# Patient Record
Sex: FEMALE | Race: BLACK OR AFRICAN AMERICAN | NOT HISPANIC OR LATINO | Employment: UNEMPLOYED | URBAN - METROPOLITAN AREA
[De-identification: names, ages, dates, MRNs, and addresses within clinical notes are randomized per-mention and may not be internally consistent; named-entity substitution may affect disease eponyms.]

---

## 2020-02-19 ENCOUNTER — HOSPITAL ENCOUNTER (OUTPATIENT)
Facility: HOSPITAL | Age: 80
Discharge: HOME | End: 2020-02-20
Attending: EMERGENCY MEDICINE | Admitting: HOSPITALIST
Payer: MEDICARE

## 2020-02-19 DIAGNOSIS — I49.3 VENTRICULAR ECTOPY: Primary | ICD-10-CM

## 2020-02-19 DIAGNOSIS — R55 SYNCOPE: ICD-10-CM

## 2020-02-19 DIAGNOSIS — R55 SYNCOPE AND COLLAPSE: ICD-10-CM

## 2020-02-19 PROBLEM — F03.90 DEMENTIA: Status: ACTIVE | Noted: 2020-02-19

## 2020-02-19 PROBLEM — E06.3 AUTOIMMUNE THYROIDITIS: Status: ACTIVE | Noted: 2020-02-19

## 2020-02-19 PROBLEM — R41.3 MEMORY LOSS: Status: ACTIVE | Noted: 2020-02-19

## 2020-02-19 LAB
ALBUMIN SERPL BCP-MCNC: 3.7 G/DL (ref 3.5–5.2)
ALP SERPL-CCNC: 130 U/L (ref 55–135)
ALT SERPL W/O P-5'-P-CCNC: 22 U/L (ref 10–44)
ANION GAP SERPL CALC-SCNC: 6 MMOL/L (ref 8–16)
AST SERPL-CCNC: 26 U/L (ref 10–40)
BASOPHILS # BLD AUTO: 0.01 K/UL (ref 0–0.2)
BASOPHILS NFR BLD: 0.2 % (ref 0–1.9)
BILIRUB SERPL-MCNC: 0.3 MG/DL (ref 0.1–1)
BILIRUB UR QL STRIP: NEGATIVE
BUN SERPL-MCNC: 18 MG/DL (ref 8–23)
CALCIUM SERPL-MCNC: 9.2 MG/DL (ref 8.7–10.5)
CHLORIDE SERPL-SCNC: 108 MMOL/L (ref 95–110)
CLARITY UR: CLEAR
CO2 SERPL-SCNC: 28 MMOL/L (ref 23–29)
COLOR UR: YELLOW
CREAT SERPL-MCNC: 1 MG/DL (ref 0.5–1.4)
DIFFERENTIAL METHOD: ABNORMAL
EOSINOPHIL # BLD AUTO: 0 K/UL (ref 0–0.5)
EOSINOPHIL NFR BLD: 1 % (ref 0–8)
ERYTHROCYTE [DISTWIDTH] IN BLOOD BY AUTOMATED COUNT: 13.5 % (ref 11.5–14.5)
EST. GFR  (AFRICAN AMERICAN): >60 ML/MIN/1.73 M^2
EST. GFR  (NON AFRICAN AMERICAN): 53 ML/MIN/1.73 M^2
GLUCOSE SERPL-MCNC: 100 MG/DL (ref 70–110)
GLUCOSE UR QL STRIP: ABNORMAL
HCT VFR BLD AUTO: 33.8 % (ref 37–48.5)
HGB BLD-MCNC: 11.2 G/DL (ref 12–16)
HGB UR QL STRIP: ABNORMAL
HYALINE CASTS #/AREA URNS LPF: 1 /LPF
IMM GRANULOCYTES # BLD AUTO: 0 K/UL (ref 0–0.04)
IMM GRANULOCYTES NFR BLD AUTO: 0 % (ref 0–0.5)
KETONES UR QL STRIP: ABNORMAL
LEUKOCYTE ESTERASE UR QL STRIP: NEGATIVE
LYMPHOCYTES # BLD AUTO: 1.5 K/UL (ref 1–4.8)
LYMPHOCYTES NFR BLD: 37.1 % (ref 18–48)
MCH RBC QN AUTO: 31.9 PG (ref 27–31)
MCHC RBC AUTO-ENTMCNC: 33.1 G/DL (ref 32–36)
MCV RBC AUTO: 96 FL (ref 82–98)
MICROSCOPIC COMMENT: NORMAL
MONOCYTES # BLD AUTO: 0.3 K/UL (ref 0.3–1)
MONOCYTES NFR BLD: 6.5 % (ref 4–15)
NEUTROPHILS # BLD AUTO: 2.3 K/UL (ref 1.8–7.7)
NEUTROPHILS NFR BLD: 55.2 % (ref 38–73)
NITRITE UR QL STRIP: NEGATIVE
NRBC BLD-RTO: 0 /100 WBC
PH UR STRIP: 7 [PH] (ref 5–8)
PLATELET # BLD AUTO: 195 K/UL (ref 150–350)
PMV BLD AUTO: 9.6 FL (ref 9.2–12.9)
POCT GLUCOSE: 98 MG/DL (ref 70–110)
POTASSIUM SERPL-SCNC: 3.7 MMOL/L (ref 3.5–5.1)
PROT SERPL-MCNC: 6.9 G/DL (ref 6–8.4)
PROT UR QL STRIP: NEGATIVE
RBC # BLD AUTO: 3.51 M/UL (ref 4–5.4)
RBC #/AREA URNS HPF: 1 /HPF (ref 0–4)
SODIUM SERPL-SCNC: 142 MMOL/L (ref 136–145)
SP GR UR STRIP: 1.01 (ref 1–1.03)
SQUAMOUS #/AREA URNS HPF: 1 /HPF
TROPONIN I SERPL DL<=0.01 NG/ML-MCNC: <0.006 NG/ML (ref 0–0.03)
TROPONIN I SERPL DL<=0.01 NG/ML-MCNC: <0.006 NG/ML (ref 0–0.03)
TSH SERPL DL<=0.005 MIU/L-ACNC: 1.72 UIU/ML (ref 0.4–4)
URN SPEC COLLECT METH UR: ABNORMAL
UROBILINOGEN UR STRIP-ACNC: NEGATIVE EU/DL
WBC # BLD AUTO: 4.15 K/UL (ref 3.9–12.7)

## 2020-02-19 PROCEDURE — 83036 HEMOGLOBIN GLYCOSYLATED A1C: CPT

## 2020-02-19 PROCEDURE — 93010 EKG 12-LEAD: ICD-10-PCS | Mod: ,,, | Performed by: INTERNAL MEDICINE

## 2020-02-19 PROCEDURE — 99285 EMERGENCY DEPT VISIT HI MDM: CPT | Mod: 25

## 2020-02-19 PROCEDURE — 84443 ASSAY THYROID STIM HORMONE: CPT

## 2020-02-19 PROCEDURE — 82962 GLUCOSE BLOOD TEST: CPT

## 2020-02-19 PROCEDURE — 36415 COLL VENOUS BLD VENIPUNCTURE: CPT

## 2020-02-19 PROCEDURE — 93010 ELECTROCARDIOGRAM REPORT: CPT | Mod: ,,, | Performed by: INTERNAL MEDICINE

## 2020-02-19 PROCEDURE — 80053 COMPREHEN METABOLIC PANEL: CPT

## 2020-02-19 PROCEDURE — 81000 URINALYSIS NONAUTO W/SCOPE: CPT

## 2020-02-19 PROCEDURE — 93005 ELECTROCARDIOGRAM TRACING: CPT

## 2020-02-19 PROCEDURE — G0378 HOSPITAL OBSERVATION PER HR: HCPCS

## 2020-02-19 PROCEDURE — 84484 ASSAY OF TROPONIN QUANT: CPT

## 2020-02-19 PROCEDURE — 85025 COMPLETE CBC W/AUTO DIFF WBC: CPT

## 2020-02-19 RX ORDER — LIOTHYRONINE SODIUM 5 UG/1
5 TABLET ORAL
Status: DISCONTINUED | OUTPATIENT
Start: 2020-02-20 | End: 2020-02-20 | Stop reason: HOSPADM

## 2020-02-19 RX ORDER — TALC
6 POWDER (GRAM) TOPICAL NIGHTLY PRN
Status: DISCONTINUED | OUTPATIENT
Start: 2020-02-19 | End: 2020-02-20 | Stop reason: HOSPADM

## 2020-02-19 RX ORDER — ACETAMINOPHEN 500 MG
500 TABLET ORAL EVERY 6 HOURS PRN
Status: DISCONTINUED | OUTPATIENT
Start: 2020-02-19 | End: 2020-02-20 | Stop reason: HOSPADM

## 2020-02-19 RX ORDER — AMLODIPINE BESYLATE 5 MG/1
10 TABLET ORAL DAILY
Status: DISCONTINUED | OUTPATIENT
Start: 2020-02-20 | End: 2020-02-20 | Stop reason: HOSPADM

## 2020-02-19 RX ORDER — DONEPEZIL HYDROCHLORIDE 10 MG/1
10 TABLET, FILM COATED ORAL NIGHTLY
Status: DISCONTINUED | OUTPATIENT
Start: 2020-02-19 | End: 2020-02-20 | Stop reason: HOSPADM

## 2020-02-19 RX ORDER — OLMESARTAN MEDOXOMIL 20 MG/1
40 TABLET ORAL DAILY
Status: DISCONTINUED | OUTPATIENT
Start: 2020-02-20 | End: 2020-02-19

## 2020-02-19 RX ORDER — SODIUM CHLORIDE 0.9 % (FLUSH) 0.9 %
10 SYRINGE (ML) INJECTION
Status: DISCONTINUED | OUTPATIENT
Start: 2020-02-19 | End: 2020-02-20 | Stop reason: HOSPADM

## 2020-02-19 RX ORDER — MONTELUKAST SODIUM 10 MG/1
10 TABLET ORAL NIGHTLY
Status: DISCONTINUED | OUTPATIENT
Start: 2020-02-19 | End: 2020-02-20 | Stop reason: HOSPADM

## 2020-02-19 RX ORDER — ATORVASTATIN CALCIUM 10 MG/1
10 TABLET, FILM COATED ORAL NIGHTLY
Status: DISCONTINUED | OUTPATIENT
Start: 2020-02-19 | End: 2020-02-20 | Stop reason: HOSPADM

## 2020-02-19 RX ORDER — HYDRALAZINE HYDROCHLORIDE 20 MG/ML
10 INJECTION INTRAMUSCULAR; INTRAVENOUS EVERY 8 HOURS PRN
Status: DISCONTINUED | OUTPATIENT
Start: 2020-02-19 | End: 2020-02-20 | Stop reason: HOSPADM

## 2020-02-19 RX ORDER — MEMANTINE HYDROCHLORIDE 5 MG/1
10 TABLET ORAL 2 TIMES DAILY
Status: DISCONTINUED | OUTPATIENT
Start: 2020-02-19 | End: 2020-02-20 | Stop reason: HOSPADM

## 2020-02-19 RX ORDER — AMOXICILLIN 250 MG
1 CAPSULE ORAL DAILY PRN
Status: DISCONTINUED | OUTPATIENT
Start: 2020-02-19 | End: 2020-02-20 | Stop reason: HOSPADM

## 2020-02-19 RX ORDER — ONDANSETRON 2 MG/ML
4 INJECTION INTRAMUSCULAR; INTRAVENOUS EVERY 6 HOURS PRN
Status: DISCONTINUED | OUTPATIENT
Start: 2020-02-19 | End: 2020-02-20 | Stop reason: HOSPADM

## 2020-02-19 RX ORDER — OLMESARTAN MEDOXOMIL 20 MG/1
40 TABLET ORAL NIGHTLY
Status: DISCONTINUED | OUTPATIENT
Start: 2020-02-19 | End: 2020-02-20 | Stop reason: HOSPADM

## 2020-02-19 NOTE — ED NOTES
Spoke with lab regarding delayed resulting for tests, approximately 15mins left for TSH testing. Will continue to monitor pt.

## 2020-02-19 NOTE — ED PROVIDER NOTES
"Encounter Date: 2/19/2020    SCRIBE #1 NOTE: I, Amita James, am scribing for, and in the presence of,  Orin Mcgregor MD. I have scribed the following portions of the note - Other sections scribed: HPI, ROS, PE, MDM.       History     Chief Complaint   Patient presents with    Loss of Consciousness     pt was leaning against a wall when family states she "went down".  they lowered her to ground.  pt denies pain or complaints     79 year old female patient presents to the ED complaining of an episode of syncope onset today. Her daughter reports that they were having a conversation when the patient leaned against a wall and began to collapse. She reports that her  saw that the patient was shaking before she fell. She reports that the patient lost consciousness for a few seconds. The patient denies any chest pain, leg swelling, or any other associated symptoms. She denies any prior history of these symptoms. She reports a past medical history of Hypertension, Hyperlipidemia, and autoimmune thyroiditis. She denies any tobacco use.    The history is provided by the patient and a relative.     Review of patient's allergies indicates:   Allergen Reactions    Chocolate flavor Hives    Pcn [penicillins] Rash     Past Medical History:   Diagnosis Date    GERD (gastroesophageal reflux disease)     Glaucoma (increased eye pressure)     Hyperlipidemia     Hypertension     Kidney stones     Osteoarthritis      Past Surgical History:   Procedure Laterality Date    CHOLECYSTECTOMY  2012    Laparoscopic    TUBAL LIGATION       Family History   Problem Relation Age of Onset    Diabetes Brother     Hypertension Mother     Stroke Neg Hx     Cancer Neg Hx     Breast cancer Neg Hx     Colon cancer Neg Hx     Ovarian cancer Neg Hx      Social History     Tobacco Use    Smoking status: Never Smoker    Smokeless tobacco: Never Used   Substance Use Topics    Alcohol use: No     Alcohol/week: 0.0 standard drinks "    Drug use: No     Review of Systems   Constitutional: Negative for fever.   HENT: Negative for sore throat.    Eyes: Negative for visual disturbance.   Respiratory: Negative for shortness of breath.    Cardiovascular: Negative for chest pain and leg swelling.   Gastrointestinal: Negative for nausea.   Genitourinary: Negative for dysuria.   Musculoskeletal: Negative for back pain.   Skin: Negative for rash.   Neurological: Positive for syncope.       Physical Exam     Initial Vitals [02/19/20 1209]   BP Pulse Resp Temp SpO2   129/68 64 16 97.8 °F (36.6 °C) 100 %      MAP       --         Physical Exam    Nursing note and vitals reviewed.  Constitutional: She appears well-developed and well-nourished. She is not diaphoretic. She appears distressed (Mild).   HENT:   Head: Normocephalic and atraumatic.   Eyes: Conjunctivae and EOM are normal. No scleral icterus. Right eye exhibits no nystagmus. Left eye exhibits no nystagmus.   No skew.   Neck: Normal range of motion. Neck supple.   Cardiovascular: Normal heart sounds. Exam reveals no gallop and no friction rub.    No murmur heard.  Occasional PVC's on monitor.   Pulmonary/Chest: Breath sounds normal. No respiratory distress. She has no wheezes. She has no rhonchi. She has no rales.   Abdominal: Soft. She exhibits no distension.   Musculoskeletal: Normal range of motion. She exhibits no edema or tenderness.   Neurological: She is alert and oriented to person, place, and time. GCS score is 15. GCS eye subscore is 4. GCS verbal subscore is 5. GCS motor subscore is 6.   No pronator drift.   Skin: Skin is warm and dry. Capillary refill takes less than 2 seconds. No rash noted.         ED Course   Procedures  Labs Reviewed   CBC W/ AUTO DIFFERENTIAL - Abnormal; Notable for the following components:       Result Value    RBC 3.51 (*)     Hemoglobin 11.2 (*)     Hematocrit 33.8 (*)     Mean Corpuscular Hemoglobin 31.9 (*)     All other components within normal limits    COMPREHENSIVE METABOLIC PANEL - Abnormal; Notable for the following components:    Anion Gap 6 (*)     eGFR if non  53 (*)     All other components within normal limits   TSH   TROPONIN I   POCT GLUCOSE MONITORING CONTINUOUS        ECG Results          EKG and show to ED MD (In process)  Result time 02/19/20 15:16:45    In process by Interface, Lab In [facility] (02/19/20 15:16:45)                 Narrative:    Test Reason : R55,    Vent. Rate : 065 BPM     Atrial Rate : 065 BPM     P-R Int : 160 ms          QRS Dur : 088 ms      QT Int : 420 ms       P-R-T Axes : 076 027 057 degrees     QTc Int : 436 ms    Normal sinus rhythm  Normal ECG  No previous ECGs available    Referred By: [email or web address]   SELF           Confirmed By:                   In process by Interface, Lab In [facility] (02/19/20 15:13:46)                 Narrative:    Test Reason : R55,    Vent. Rate : 065 BPM     Atrial Rate : 065 BPM     P-R Int : 160 ms          QRS Dur : 088 ms      QT Int : 420 ms       P-R-T Axes : 076 027 057 degrees     QTc Int : 436 ms    Normal sinus rhythm  Normal ECG  No previous ECGs available    Referred By: [email or web address]   SELF           Confirmed By:                             Imaging Results    None          Medical Decision Making:   History:   Old Medical Records: I decided to obtain old medical records.  Old Records Summarized: records from clinic visits.       <> Summary of Records: The patient has been seen frequently in the last few months for Alzheimer's disease and autoimmune thyroiditis.  Initial Assessment:   79 year old female patient presents to the ED complaining of an episode of syncope onset today. I will order lab work and reassess the patient once I have reviewed the results.  Clinical Tests:   Lab Tests: Ordered and Reviewed  Medical Tests: Ordered and Reviewed            Scribe Attestation:   Scribe #1: I performed the above scribed service and the documentation accurately describes the  services I performed. I attest to the accuracy of the note.            ED Course as of Feb 19 1623   Wed Feb 19, 2020   1523 CBC revealsA mild anemia of 11 and 33    [MH]   1523 Troponin is normal   Troponin I: <0.006 [MH]   1523 TSH is normal    [MH]   1523 CMP is normal    [MH]   1523 My independent interpretation of the EKG is normal sinus rhythm with no ST segment elevation or depression.  There is some left axis deviation.   EKG and show to ED MD [MH]      ED Course User Index  [MH] Orin Mcgregor MD                Clinical Impression:       ICD-10-CM ICD-9-CM   1. Syncope R55 780.2       MDM:  Patient does not meet low risk criteria for syncope due to her age and ectopy on the monitor.  I will place her in observation telemetry monitoring and trending of troponin.    3:55 PM  The medical management of Christa Caicedo has been transferred to the admitting team. Total Critical Care Time provided by me was: 45 minutes and included Coordination of care with other physicians  Review and interpretation of radiologic studies with re-assessment of plan of care  Review and interpretation of laboratory studies with re-assessment of plan of care  Review of diagnosis, treatment options and prognosis with patient  Review of diagnosis, treatment options and prognosis with family/caregiver.  At this time, the patient's condition is unchanged, and this was relayed to the accepting team.  Please see notes from the admitting team for continued care.  Orin Mcgregor 3:55 PM           I personally performed the services described in this documentation. All medical record entries made by the scribe were at my direction and in my presence. I have reviewed the chart and agree that the record reflects my personal performance and is accurate and complete.  Orin Mcgregor MD  02/19/20 1550       Orin Mcgregor MD  02/19/20 1555       Orin Mcgregor MD  02/19/20 1623

## 2020-02-20 VITALS
HEIGHT: 67 IN | TEMPERATURE: 98 F | OXYGEN SATURATION: 100 % | DIASTOLIC BLOOD PRESSURE: 67 MMHG | HEART RATE: 62 BPM | SYSTOLIC BLOOD PRESSURE: 143 MMHG | BODY MASS INDEX: 25.43 KG/M2 | WEIGHT: 162 LBS | RESPIRATION RATE: 18 BRPM

## 2020-02-20 LAB
ANION GAP SERPL CALC-SCNC: 7 MMOL/L (ref 8–16)
AORTIC ROOT ANNULUS: 3.25 CM
AORTIC VALVE CUSP SEPERATION: 2.23 CM
ASCENDING AORTA: 2.99 CM
AV INDEX (PROSTH): 0.8
AV MEAN GRADIENT: 2 MMHG
AV PEAK GRADIENT: 5 MMHG
AV VALVE AREA: 2.77 CM2
AV VELOCITY RATIO: 0.7
BSA FOR ECHO PROCEDURE: 1.86 M2
BUN SERPL-MCNC: 12 MG/DL (ref 8–23)
CALCIUM SERPL-MCNC: 9.3 MG/DL (ref 8.7–10.5)
CHLORIDE SERPL-SCNC: 108 MMOL/L (ref 95–110)
CHOLEST SERPL-MCNC: 184 MG/DL (ref 120–199)
CHOLEST/HDLC SERPL: 2 {RATIO} (ref 2–5)
CO2 SERPL-SCNC: 26 MMOL/L (ref 23–29)
CREAT SERPL-MCNC: 0.7 MG/DL (ref 0.5–1.4)
CV ECHO LV RWT: 0.44 CM
DOP CALC AO PEAK VEL: 1.14 M/S
DOP CALC AO VTI: 25.03 CM
DOP CALC LVOT AREA: 3.5 CM2
DOP CALC LVOT DIAMETER: 2.1 CM
DOP CALC LVOT PEAK VEL: 0.8 M/S
DOP CALC LVOT STROKE VOLUME: 69.41 CM3
DOP CALCLVOT PEAK VEL VTI: 20.05 CM
E WAVE DECELERATION TIME: 302.96 MSEC
E/A RATIO: 0.77
E/E' RATIO: 8.8 M/S
ECHO LV POSTERIOR WALL: 0.99 CM (ref 0.6–1.1)
EST. GFR  (AFRICAN AMERICAN): >60 ML/MIN/1.73 M^2
EST. GFR  (NON AFRICAN AMERICAN): >60 ML/MIN/1.73 M^2
ESTIMATED AVG GLUCOSE: 123 MG/DL (ref 68–131)
FRACTIONAL SHORTENING: 29 % (ref 28–44)
GLUCOSE SERPL-MCNC: 86 MG/DL (ref 70–110)
HBA1C MFR BLD HPLC: 5.9 % (ref 4–5.6)
HDLC SERPL-MCNC: 91 MG/DL (ref 40–75)
HDLC SERPL: 49.5 % (ref 20–50)
INTERVENTRICULAR SEPTUM: 1.08 CM (ref 0.6–1.1)
IVRT: 0.13 MSEC
LA MAJOR: 5.25 CM
LA MINOR: 4.98 CM
LA WIDTH: 4.23 CM
LDLC SERPL CALC-MCNC: 84.2 MG/DL (ref 63–159)
LEFT ATRIUM SIZE: 3.33 CM
LEFT ATRIUM VOLUME INDEX: 33.1 ML/M2
LEFT ATRIUM VOLUME: 61.2 CM3
LEFT INTERNAL DIMENSION IN SYSTOLE: 3.15 CM (ref 2.1–4)
LEFT VENTRICLE DIASTOLIC VOLUME INDEX: 49.06 ML/M2
LEFT VENTRICLE DIASTOLIC VOLUME: 90.71 ML
LEFT VENTRICLE MASS INDEX: 86 G/M2
LEFT VENTRICLE SYSTOLIC VOLUME INDEX: 21.4 ML/M2
LEFT VENTRICLE SYSTOLIC VOLUME: 39.54 ML
LEFT VENTRICULAR INTERNAL DIMENSION IN DIASTOLE: 4.46 CM (ref 3.5–6)
LEFT VENTRICULAR MASS: 158.45 G
LV LATERAL E/E' RATIO: 6.6 M/S
LV SEPTAL E/E' RATIO: 13.2 M/S
MV PEAK A VEL: 0.86 M/S
MV PEAK E VEL: 0.66 M/S
NONHDLC SERPL-MCNC: 93 MG/DL
PISA TR MAX VEL: 2.87 M/S
POTASSIUM SERPL-SCNC: 3.4 MMOL/L (ref 3.5–5.1)
PULM VEIN S/D RATIO: 1.69
PV PEAK D VEL: 0.39 M/S
PV PEAK S VEL: 0.66 M/S
PV PEAK VELOCITY: 0.69 CM/S
RA MAJOR: 5.25 CM
RA WIDTH: 4.53 CM
RIGHT VENTRICULAR END-DIASTOLIC DIMENSION: 3.11 CM
RV TISSUE DOPPLER FREE WALL SYSTOLIC VELOCITY 1 (APICAL 4 CHAMBER VIEW): 16.3 CM/S
SINUS: 2.92 CM
SODIUM SERPL-SCNC: 141 MMOL/L (ref 136–145)
STJ: 2.38 CM
TDI LATERAL: 0.1 M/S
TDI SEPTAL: 0.05 M/S
TDI: 0.08 M/S
TR MAX PG: 33 MMHG
TRICUSPID ANNULAR PLANE SYSTOLIC EXCURSION: 2.67 CM
TRIGL SERPL-MCNC: 44 MG/DL (ref 30–150)
TROPONIN I SERPL DL<=0.01 NG/ML-MCNC: <0.006 NG/ML (ref 0–0.03)

## 2020-02-20 PROCEDURE — 99219 PR INITIAL OBSERVATION CARE,LEVL II: ICD-10-PCS | Mod: 25,,, | Performed by: INTERNAL MEDICINE

## 2020-02-20 PROCEDURE — G0378 HOSPITAL OBSERVATION PER HR: HCPCS

## 2020-02-20 PROCEDURE — 84484 ASSAY OF TROPONIN QUANT: CPT

## 2020-02-20 PROCEDURE — 80048 BASIC METABOLIC PNL TOTAL CA: CPT

## 2020-02-20 PROCEDURE — 80061 LIPID PANEL: CPT

## 2020-02-20 PROCEDURE — 36415 COLL VENOUS BLD VENIPUNCTURE: CPT

## 2020-02-20 PROCEDURE — 99219 PR INITIAL OBSERVATION CARE,LEVL II: CPT | Mod: 25,,, | Performed by: INTERNAL MEDICINE

## 2020-02-20 PROCEDURE — 25000003 PHARM REV CODE 250: Performed by: NURSE PRACTITIONER

## 2020-02-20 RX ORDER — DONEPEZIL HYDROCHLORIDE 10 MG/1
10 TABLET, FILM COATED ORAL NIGHTLY
Qty: 30 TABLET | Refills: 11
Start: 2020-02-20 | End: 2021-02-19

## 2020-02-20 RX ORDER — POTASSIUM CHLORIDE 20 MEQ/1
40 TABLET, EXTENDED RELEASE ORAL ONCE
Status: COMPLETED | OUTPATIENT
Start: 2020-02-20 | End: 2020-02-20

## 2020-02-20 RX ORDER — MEMANTINE HYDROCHLORIDE 10 MG/1
10 TABLET ORAL 2 TIMES DAILY
Qty: 60 TABLET | Refills: 0
Start: 2020-02-20 | End: 2021-02-19

## 2020-02-20 RX ORDER — ATORVASTATIN CALCIUM 10 MG/1
10 TABLET, FILM COATED ORAL NIGHTLY
Qty: 90 TABLET | Refills: 3
Start: 2020-02-20 | End: 2021-02-19

## 2020-02-20 RX ORDER — OLMESARTAN MEDOXOMIL 40 MG/1
40 TABLET ORAL NIGHTLY
Qty: 90 TABLET | Refills: 3 | Status: SHIPPED | OUTPATIENT
Start: 2020-02-20 | End: 2021-02-19

## 2020-02-20 RX ORDER — LIOTHYRONINE SODIUM 5 UG/1
5 TABLET ORAL
Qty: 30 TABLET | Refills: 0
Start: 2020-02-21 | End: 2021-02-20

## 2020-02-20 RX ORDER — AMLODIPINE BESYLATE 10 MG/1
10 TABLET ORAL DAILY
Qty: 30 TABLET | Refills: 11 | Status: SHIPPED | OUTPATIENT
Start: 2020-02-21 | End: 2020-02-26

## 2020-02-20 RX ORDER — MONTELUKAST SODIUM 10 MG/1
10 TABLET ORAL NIGHTLY
Qty: 30 TABLET | Refills: 0
Start: 2020-02-20 | End: 2020-03-21

## 2020-02-20 RX ADMIN — POTASSIUM CHLORIDE 40 MEQ: 1500 TABLET, EXTENDED RELEASE ORAL at 09:02

## 2020-02-20 NOTE — HOSPITAL COURSE
Christa Caicedo 79 y.o. female placed in observation for syncope possible due to poor intake. CT head no acute intracranial abnormality. US carotid no stenosis. UA no infection. Tele no ectomy. Troponin negative x 3. 2 D echo EF 55%, indeterminate LF diastolic function and no WMA. Patient remained without neuro deficits during observation stay. Patient stable for discharge home with family. Cardiology recommended outpatient event monitor.

## 2020-02-20 NOTE — H&P
"[facility] Hospital Medicine  History & Physical    Patient Name: Christa Caicedo  MRN: [id number]  Admission Date: 2/19/2020  Attending Physician: Stefani Gilliam MD   Primary Care Provider: Jeanette Mckeon MD         Patient information was obtained from patient, past medical records and ER records.     Subjective:     Principal Problem:Syncope    Chief Complaint:   Chief Complaint   Patient presents with    Loss of Consciousness     pt was leaning against a wall when family states she "went down".  they lowered her to ground.  pt denies pain or complaints        HPI: Christa Caicedo 79 y.o. female with dementia, hypothyroidism, HLD, HTN presents to the hospital with a chief complaint of syncope. She reports today she was standing in her house and then was found by her family leaning against the wall. She then remembers walking to her bed at her family's direction. She denies any head trauma, incontinence, or tongue biting. She feels well currently. She has never experienced symptoms such as this before. She denies fever, chest pain, SOB, N/V, abdominal pain, leg swelling, dizziness, weakness of extremity, numbness, visual disturbance, and palpitations.     In the ED, EKG of NSR, troponin negative, TSH 1.7, and BG 98.     Past Medical History:   Diagnosis Date    GERD (gastroesophageal reflux disease)     Glaucoma (increased eye pressure)     Hyperlipidemia     Hypertension     Kidney stones     Osteoarthritis     Syncope 02/19/2020       Past Surgical History:   Procedure Laterality Date    CHOLECYSTECTOMY  2012    Laparoscopic    TUBAL LIGATION      2/19/2020:  patient and daughter denies procedure was done       Review of patient's allergies indicates:   Allergen Reactions    Chocolate flavor Hives    Pcn [penicillins] Rash       No current facility-administered medications on file prior to encounter.      Current Outpatient Medications on File Prior to " Encounter   Medication Sig    allopurinol (ZYLOPRIM) 300 MG tablet Take 300 mg by mouth once daily.    brimonidine 0.2% (ALPHAGAN) 0.2 % Drop Place 1 drop into both eyes 2 (two) times daily.    calcium carbonate (OS-OH) 500 mg calcium (1,250 mg) tablet Take 2 tablets by mouth 2 (two) times daily.    glucosamine-chondroitin 500-400 mg tablet Take 1 tablet by mouth 2 (two) times daily.    losartan-hydrochlorothiazide 100-25 mg (HYZAAR) 100-25 mg per tablet     magnesium oxide (MAG-OX) 400 mg tablet Take 400 mg by mouth once daily.    multivitamin capsule Take 1 capsule by mouth once daily.    pravastatin (PRAVACHOL) 40 MG tablet Take 40 mg by mouth every evening.     Family History     Problem Relation (Age of Onset)    Diabetes Brother    Hypertension Mother        Tobacco Use    Smoking status: Never Smoker    Smokeless tobacco: Never Used   Substance and Sexual Activity    Alcohol use: No     Alcohol/week: 0.0 standard drinks    Drug use: No    Sexual activity: Not Currently     Birth control/protection: Post-menopausal     Comment:      Review of Systems   Constitutional: Negative for chills and fever.   HENT: Negative for nosebleeds and tinnitus.    Eyes: Negative for photophobia and visual disturbance.   Respiratory: Negative for shortness of breath and wheezing.    Cardiovascular: Negative for chest pain, palpitations and leg swelling.   Gastrointestinal: Negative for abdominal distention, nausea and vomiting.   Genitourinary: Negative for dysuria, flank pain and hematuria.   Musculoskeletal: Negative for gait problem and joint swelling.   Skin: Negative for rash and wound.   Neurological: Positive for syncope. Negative for seizures.     Objective:     Vital Signs (Most Recent):  Temp: 98.4 °F (36.9 °C) (02/19/20 1701)  Pulse: 80 (02/19/20 1601)  Resp: 19 (02/19/20 1701)  BP: (!) 173/75(Dr. Mcgregor aware) (02/19/20 1701)  SpO2: 99 % (02/19/20 1701) Vital Signs (24h Range):  Temp:  [97.8 °F  (36.6 °C)-98.4 °F (36.9 °C)] 98.4 °F (36.9 °C)  Pulse:  [61-80] 80  Resp:  [16-27] 19  SpO2:  [97 %-100 %] 99 %  BP: (129-173)/(67-75) 173/75     Weight: 72.6 kg (160 lb)  Body mass index is 25.06 kg/m².    Physical Exam   Constitutional: She is oriented to person, place, and time. She appears well-developed and well-nourished. No distress.   HENT:   Head: Normocephalic and atraumatic.   Right Ear: External ear normal.   Left Ear: External ear normal.   Eyes: Conjunctivae and EOM are normal. Right eye exhibits no discharge. Left eye exhibits no discharge.   Neck: Normal range of motion. No thyromegaly present.   Cardiovascular: Normal rate and regular rhythm.   No murmur heard.  Pulmonary/Chest: Effort normal and breath sounds normal. No respiratory distress.   Abdominal: Soft. Bowel sounds are normal. She exhibits no distension and no mass. There is no tenderness.   Musculoskeletal: She exhibits no edema or deformity.   Neurological: She is alert and oriented to person, place, and time.   5/5 strength BUE and BLE. No past pointing on finger to nose. Heel to shin intact. No sensation deficit   Skin: Skin is warm and dry.   Psychiatric: She has a normal mood and affect. Her behavior is normal.   Nursing note and vitals reviewed.        CRANIAL NERVES     CN III, IV, VI   Extraocular motions are normal.        Significant Labs:   CBC:   Recent Labs   Lab 02/19/20  1243   WBC 4.15   HGB 11.2*   HCT 33.8*        CMP:   Recent Labs   Lab 02/19/20  1243      K 3.7      CO2 28      BUN 18   CREATININE 1.0   CALCIUM 9.2   PROT 6.9   ALBUMIN 3.7   BILITOT 0.3   ALKPHOS 130   AST 26   ALT 22   ANIONGAP 6*   EGFRNONAA 53*     Cardiac Markers: No results for input(s): CKMB, MYOGLOBIN, BNP, TROPISTAT in the last 48 hours.  Troponin:   Recent Labs   Lab 02/19/20  1319   TROPONINI <0.006     TSH:   Recent Labs   Lab 02/19/20  1319   TSH 1.723       Significant Imaging:   Imaging Results    None            Assessment/Plan:     * Syncope  Reports episode of syncope while leaning on wall did not hit head or fall to ground. Awoke immediately and ambulated to the bed. Denies any symptoms currently. EKG of NSR, troponin negative, TSH within michael limits. Orthostatic negative  CT head, chest x-ray, UA pending  Troponin trend  Telemetry  Echo  Cardiology consulted for report of ectopy in ED    Autoimmune thyroiditis  Continue home synthroid TSH today of 1.723    Memory loss  Stable. Continue home namenda/donepezil    Hypertension  Fair control. Continue home amlodipine/olmesartan. PRN hydralazine    VTE Risk Mitigation (From admission, onward)         Ordered     Place sequential compression device  Until discontinued      02/19/20 1806     IP VTE HIGH RISK PATIENT  Once      02/19/20 1748     Place SHANIA hose  Until discontinued      02/19/20 1748              VTE: SHANIA/SCD  Code: Full  Diet: cardiac  Dispo: pending troponin/echo  Patient will be placed in observation with hospital medicine.       Raymundo Kelly PA-C  Department of Hospital Medicine   [facility]

## 2020-02-20 NOTE — HPI
Christa Caicedo 79 y.o. female with dementia, hypothyroidism, HLD, HTN presents to the hospital with a chief complaint of syncope. She reports today she was standing in her house and then was found by her family leaning against the wall. She then remembers walking to her bed at her family's direction. She denies any head trauma, incontinence, or tongue biting. She feels well currently. She has never experienced symptoms such as this before. She denies fever, chest pain, SOB, N/V, abdominal pain, leg swelling, dizziness, weakness of extremity, numbness, visual disturbance, and palpitations.     In the ED, EKG of NSR, troponin negative, TSH 1.7, and BG 98.

## 2020-02-20 NOTE — CONSULTS
[facility]  Cardiology  Consult Note    Patient Name: Christa Caicedo  MRN: [id number]  Admission Date: 2/19/2020  Hospital Length of Stay: 0 days  Code Status: Full Code   Attending Provider: Stefani Gilliam MD   Consulting Provider: Alvaro Velasquez MD  Primary Care Physician: Jeanette Mckeon MD  Principal Problem:Syncope    Patient information was obtained from ER records.     Inpatient consult to Cardiology  Consult performed by: Alvaro Velasquez MD  Consult ordered by: Raymundo Kelly PA-C        Subjective:     Chief Complaint:  LOC/syncope     HPI:   Christa Caicedo is an 78 yo female who presents to [facility] after a syncopal episode. Apparent LOC while standing. Dementia. HTN. HLP. Is on diuretic. Not orthostatic. EKG NSR. No hx of syncope. No hx of CAD.     Past Medical History:   Diagnosis Date    GERD (gastroesophageal reflux disease)     Glaucoma (increased eye pressure)     Hyperlipidemia     Hypertension     Kidney stones     Osteoarthritis     Syncope 02/19/2020       Past Surgical History:   Procedure Laterality Date    CHOLECYSTECTOMY  2012    Laparoscopic    TUBAL LIGATION      2/19/2020:  patient and daughter denies procedure was done       Review of patient's allergies indicates:   Allergen Reactions    Chocolate flavor Hives    Pcn [penicillins] Rash       No current facility-administered medications on file prior to encounter.      Current Outpatient Medications on File Prior to Encounter   Medication Sig    allopurinol (ZYLOPRIM) 300 MG tablet Take 300 mg by mouth once daily.    brimonidine 0.2% (ALPHAGAN) 0.2 % Drop Place 1 drop into both eyes 2 (two) times daily.    calcium carbonate (OS-OH) 500 mg calcium (1,250 mg) tablet Take 2 tablets by mouth 2 (two) times daily.    glucosamine-chondroitin 500-400 mg tablet Take 1 tablet by mouth 2 (two) times daily.    losartan-hydrochlorothiazide 100-25 mg (HYZAAR) 100-25 mg per tablet     magnesium  oxide (MAG-OX) 400 mg tablet Take 400 mg by mouth once daily.    multivitamin capsule Take 1 capsule by mouth once daily.    pravastatin (PRAVACHOL) 40 MG tablet Take 40 mg by mouth every evening.     Family History     Problem Relation (Age of Onset)    Diabetes Brother    Hypertension Mother        Tobacco Use    Smoking status: Never Smoker    Smokeless tobacco: Never Used   Substance and Sexual Activity    Alcohol use: No     Alcohol/week: 0.0 standard drinks    Drug use: No    Sexual activity: Not Currently     Birth control/protection: Post-menopausal     Comment:      Review of Systems   Unable to perform ROS: dementia     Objective:     Vital Signs (Most Recent):  Temp: 98 °F (36.7 °C) (02/20/20 0748)  Pulse: 62 (02/20/20 0748)  Resp: 18 (02/20/20 0748)  BP: (!) 142/71 (02/20/20 0748)  SpO2: 95 % (02/20/20 0748) Vital Signs (24h Range):  Temp:  [97.6 °F (36.4 °C)-98.4 °F (36.9 °C)] 98 °F (36.7 °C)  Pulse:  [59-80] 62  Resp:  [16-27] 18  SpO2:  [95 %-100 %] 95 %  BP: (129-173)/(67-77) 142/71     Weight: 73.6 kg (162 lb 4.1 oz)  Body mass index is 25.41 kg/m².    SpO2: 95 %  O2 Device (Oxygen Therapy): room air    No intake or output data in the 24 hours ending 02/20/20 0932    Lines/Drains/Airways     Peripheral Intravenous Line                 Peripheral IV - Single Lumen 02/19/20 1212 18 G Left Antecubital less than 1 day                Physical Exam   Constitutional: No distress.   Cardiovascular: Normal rate, regular rhythm, normal heart sounds and intact distal pulses.   Pulmonary/Chest: Effort normal and breath sounds normal.   Abdominal: Soft. Bowel sounds are normal. There is no tenderness.   Musculoskeletal:        Right lower leg: She exhibits no edema.        Left lower leg: She exhibits no edema.   Neurological: She is alert.   Skin: She is not diaphoretic.   Vitals reviewed.      Significant Labs:   CMP   Recent Labs   Lab 02/19/20  1243 02/20/20  0410    141   K 3.7 3.4*   CL  108 108   CO2 28 26    86   BUN 18 12   CREATININE 1.0 0.7   CALCIUM 9.2 9.3   PROT 6.9  --    ALBUMIN 3.7  --    BILITOT 0.3  --    ALKPHOS 130  --    AST 26  --    ALT 22  --    ANIONGAP 6* 7*   ESTGFRAFRICA >60 >60   EGFRNONAA 53* >60   , CBC   Recent Labs   Lab 02/19/20  1243   WBC 4.15   HGB 11.2*   HCT 33.8*      , Lipid Panel   Recent Labs   Lab 02/20/20  0410   CHOL 184   HDL 91*   LDLCALC 84.2   TRIG 44   CHOLHDL 49.5    and Troponin   Recent Labs   Lab 02/19/20  1319 02/19/20  1912 02/20/20  0103   TROPONINI <0.006 <0.006 <0.006       Significant Imaging: EKG: NSR    Assessment and Plan:     * Syncope  Outpatient event monitor. Possibly due to medications. Neuro consult.     Hypertension  Monitor.         VTE Risk Mitigation (From admission, onward)         Ordered     Place sequential compression device  Until discontinued      02/19/20 1806     IP VTE HIGH RISK PATIENT  Once      02/19/20 1748     Place SHANIA hose  Until discontinued      02/19/20 1748                Thank you for your consult. I will follow-up with patient. Please contact us if you have any additional questions.    Alvaro Velasquez MD  Cardiology   [facility]

## 2020-02-20 NOTE — PLAN OF CARE
02/20/20 1211   Final Note   Assessment Type Final Discharge Note   Anticipated Discharge Disposition Home   Hospital Follow Up  Appt(s) scheduled? Yes   Discharge plans and expectations educations in teach back method with documentation complete? Yes   Right Care Referral Info   Post Acute Recommendation No Care   Post-Acute Status   Post-Acute Authorization Other   Other Status No Post-Acute Service Needs   pts nurse Carmelita notified that pt can d/c from CM standpoint.

## 2020-02-20 NOTE — PLAN OF CARE
02/20/20 1134   Discharge Assessment   Assessment Type Discharge Planning Assessment   Assessment information obtained from? Medical Record   Prior to hospitilization cognitive status: Alert/Oriented   Prior to hospitalization functional status: Assistive Equipment;Needs Assistance   Current cognitive status: Alert/Oriented   Current Functional Status: Assistive Equipment;Needs Assistance   Facility Arrived From: home   Lives With other (see comments)   Able to Return to Prior Arrangements yes   Is patient able to care for self after discharge? Yes   Who are your caregiver(s) and their phone number(s)? Giovana- [phone]   Patient's perception of discharge disposition home or selfcare   Readmission Within the Last 30 Days no previous admission in last 30 days   Patient currently being followed by outpatient case management? No   Patient currently receives any other outside agency services? No   Equipment Currently Used at Home none   Do you have any problems affording any of your prescribed medications? No   Is the patient taking medications as prescribed? yes   Does the patient have transportation home? Yes   Transportation Anticipated family or friend will provide   Does the patient receive services at the [facility]? No   Discharge Plan A Home with family  (with follow up )   DME Needed Upon Discharge  none   Patient/Family in Agreement with Plan yes     [id number] - [place] [id number]  [address] [place] [address] [address]  [facility] [address]  Phone: [phone] Fax: [phone]    [facility] - [facility] [address] [address] [address]  [address] [place] [id number]  Phone: [phone] Fax: [phone]    [facility] - [facility] [address] [facility] [address]  [address] [place] [id number]  Phone: [phone] Fax: [phone]

## 2020-02-20 NOTE — SUBJECTIVE & OBJECTIVE
Past Medical History:   Diagnosis Date    GERD (gastroesophageal reflux disease)     Glaucoma (increased eye pressure)     Hyperlipidemia     Hypertension     Kidney stones     Osteoarthritis     Syncope 02/19/2020       Past Surgical History:   Procedure Laterality Date    CHOLECYSTECTOMY  2012    Laparoscopic    TUBAL LIGATION      2/19/2020:  patient and daughter denies procedure was done       Review of patient's allergies indicates:   Allergen Reactions    Chocolate flavor Hives    Pcn [penicillins] Rash       No current facility-administered medications on file prior to encounter.      Current Outpatient Medications on File Prior to Encounter   Medication Sig    allopurinol (ZYLOPRIM) 300 MG tablet Take 300 mg by mouth once daily.    brimonidine 0.2% (ALPHAGAN) 0.2 % Drop Place 1 drop into both eyes 2 (two) times daily.    calcium carbonate (OS-OH) 500 mg calcium (1,250 mg) tablet Take 2 tablets by mouth 2 (two) times daily.    glucosamine-chondroitin 500-400 mg tablet Take 1 tablet by mouth 2 (two) times daily.    losartan-hydrochlorothiazide 100-25 mg (HYZAAR) 100-25 mg per tablet     magnesium oxide (MAG-OX) 400 mg tablet Take 400 mg by mouth once daily.    multivitamin capsule Take 1 capsule by mouth once daily.    pravastatin (PRAVACHOL) 40 MG tablet Take 40 mg by mouth every evening.     Family History     Problem Relation (Age of Onset)    Diabetes Brother    Hypertension Mother        Tobacco Use    Smoking status: Never Smoker    Smokeless tobacco: Never Used   Substance and Sexual Activity    Alcohol use: No     Alcohol/week: 0.0 standard drinks    Drug use: No    Sexual activity: Not Currently     Birth control/protection: Post-menopausal     Comment:      Review of Systems   Constitutional: Negative for chills and fever.   HENT: Negative for nosebleeds and tinnitus.    Eyes: Negative for photophobia and visual disturbance.   Respiratory: Negative for shortness of  breath and wheezing.    Cardiovascular: Negative for chest pain, palpitations and leg swelling.   Gastrointestinal: Negative for abdominal distention, nausea and vomiting.   Genitourinary: Negative for dysuria, flank pain and hematuria.   Musculoskeletal: Negative for gait problem and joint swelling.   Skin: Negative for rash and wound.   Neurological: Positive for syncope. Negative for seizures.     Objective:     Vital Signs (Most Recent):  Temp: 98.4 °F (36.9 °C) (02/19/20 1701)  Pulse: 80 (02/19/20 1601)  Resp: 19 (02/19/20 1701)  BP: (!) 173/75(Dr. Mcgregor aware) (02/19/20 1701)  SpO2: 99 % (02/19/20 1701) Vital Signs (24h Range):  Temp:  [97.8 °F (36.6 °C)-98.4 °F (36.9 °C)] 98.4 °F (36.9 °C)  Pulse:  [61-80] 80  Resp:  [16-27] 19  SpO2:  [97 %-100 %] 99 %  BP: (129-173)/(67-75) 173/75     Weight: 72.6 kg (160 lb)  Body mass index is 25.06 kg/m².    Physical Exam   Constitutional: She is oriented to person, place, and time. She appears well-developed and well-nourished. No distress.   HENT:   Head: Normocephalic and atraumatic.   Right Ear: External ear normal.   Left Ear: External ear normal.   Eyes: Conjunctivae and EOM are normal. Right eye exhibits no discharge. Left eye exhibits no discharge.   Neck: Normal range of motion. No thyromegaly present.   Cardiovascular: Normal rate and regular rhythm.   No murmur heard.  Pulmonary/Chest: Effort normal and breath sounds normal. No respiratory distress.   Abdominal: Soft. Bowel sounds are normal. She exhibits no distension and no mass. There is no tenderness.   Musculoskeletal: She exhibits no edema or deformity.   Neurological: She is alert and oriented to person, place, and time.   5/5 strength BUE and BLE. No past pointing on finger to nose. Heel to shin intact. No sensation deficit   Skin: Skin is warm and dry.   Psychiatric: She has a normal mood and affect. Her behavior is normal.   Nursing note and vitals reviewed.        CRANIAL NERVES     CN III, IV, VI    Extraocular motions are normal.        Significant Labs:   CBC:   Recent Labs   Lab 02/19/20  1243   WBC 4.15   HGB 11.2*   HCT 33.8*        CMP:   Recent Labs   Lab 02/19/20  1243      K 3.7      CO2 28      BUN 18   CREATININE 1.0   CALCIUM 9.2   PROT 6.9   ALBUMIN 3.7   BILITOT 0.3   ALKPHOS 130   AST 26   ALT 22   ANIONGAP 6*   EGFRNONAA 53*     Cardiac Markers: No results for input(s): CKMB, MYOGLOBIN, BNP, TROPISTAT in the last 48 hours.  Troponin:   Recent Labs   Lab 02/19/20  1319   TROPONINI <0.006     TSH:   Recent Labs   Lab 02/19/20  1319   TSH 1.723       Significant Imaging:   Imaging Results    None

## 2020-02-20 NOTE — ASSESSMENT & PLAN NOTE
Reports episode of syncope while leaning on wall did not hit head or fall to ground. Awoke immediately and ambulated to the bed. Denies any symptoms currently. EKG of NSR, troponin negative, TSH within michael limits. Orthostatic negative  CT head, chest x-ray, UA pending  Troponin trend  Telemetry  Echo  Cardiology consulted for report of ectopy in ED

## 2020-02-20 NOTE — SUBJECTIVE & OBJECTIVE
Past Medical History:   Diagnosis Date    GERD (gastroesophageal reflux disease)     Glaucoma (increased eye pressure)     Hyperlipidemia     Hypertension     Kidney stones     Osteoarthritis     Syncope 02/19/2020       Past Surgical History:   Procedure Laterality Date    CHOLECYSTECTOMY  2012    Laparoscopic    TUBAL LIGATION      2/19/2020:  patient and daughter denies procedure was done       Review of patient's allergies indicates:   Allergen Reactions    Chocolate flavor Hives    Pcn [penicillins] Rash       No current facility-administered medications on file prior to encounter.      Current Outpatient Medications on File Prior to Encounter   Medication Sig    allopurinol (ZYLOPRIM) 300 MG tablet Take 300 mg by mouth once daily.    brimonidine 0.2% (ALPHAGAN) 0.2 % Drop Place 1 drop into both eyes 2 (two) times daily.    calcium carbonate (OS-OH) 500 mg calcium (1,250 mg) tablet Take 2 tablets by mouth 2 (two) times daily.    glucosamine-chondroitin 500-400 mg tablet Take 1 tablet by mouth 2 (two) times daily.    losartan-hydrochlorothiazide 100-25 mg (HYZAAR) 100-25 mg per tablet     magnesium oxide (MAG-OX) 400 mg tablet Take 400 mg by mouth once daily.    multivitamin capsule Take 1 capsule by mouth once daily.    pravastatin (PRAVACHOL) 40 MG tablet Take 40 mg by mouth every evening.     Family History     Problem Relation (Age of Onset)    Diabetes Brother    Hypertension Mother        Tobacco Use    Smoking status: Never Smoker    Smokeless tobacco: Never Used   Substance and Sexual Activity    Alcohol use: No     Alcohol/week: 0.0 standard drinks    Drug use: No    Sexual activity: Not Currently     Birth control/protection: Post-menopausal     Comment:      Review of Systems   Unable to perform ROS: dementia     Objective:     Vital Signs (Most Recent):  Temp: 98 °F (36.7 °C) (02/20/20 0748)  Pulse: 62 (02/20/20 0748)  Resp: 18 (02/20/20 0748)  BP: (!) 142/71 (02/20/20  0748)  SpO2: 95 % (02/20/20 0748) Vital Signs (24h Range):  Temp:  [97.6 °F (36.4 °C)-98.4 °F (36.9 °C)] 98 °F (36.7 °C)  Pulse:  [59-80] 62  Resp:  [16-27] 18  SpO2:  [95 %-100 %] 95 %  BP: (129-173)/(67-77) 142/71     Weight: 73.6 kg (162 lb 4.1 oz)  Body mass index is 25.41 kg/m².    SpO2: 95 %  O2 Device (Oxygen Therapy): room air    No intake or output data in the 24 hours ending 02/20/20 0932    Lines/Drains/Airways     Peripheral Intravenous Line                 Peripheral IV - Single Lumen 02/19/20 1212 18 G Left Antecubital less than 1 day                Physical Exam   Constitutional: No distress.   Cardiovascular: Normal rate, regular rhythm, normal heart sounds and intact distal pulses.   Pulmonary/Chest: Effort normal and breath sounds normal.   Abdominal: Soft. Bowel sounds are normal. There is no tenderness.   Musculoskeletal:        Right lower leg: She exhibits no edema.        Left lower leg: She exhibits no edema.   Neurological: She is alert.   Skin: She is not diaphoretic.   Vitals reviewed.      Significant Labs:   CMP   Recent Labs   Lab 02/19/20  1243 02/20/20  0410    141   K 3.7 3.4*    108   CO2 28 26    86   BUN 18 12   CREATININE 1.0 0.7   CALCIUM 9.2 9.3   PROT 6.9  --    ALBUMIN 3.7  --    BILITOT 0.3  --    ALKPHOS 130  --    AST 26  --    ALT 22  --    ANIONGAP 6* 7*   ESTGFRAFRICA >60 >60   EGFRNONAA 53* >60   , CBC   Recent Labs   Lab 02/19/20  1243   WBC 4.15   HGB 11.2*   HCT 33.8*      , Lipid Panel   Recent Labs   Lab 02/20/20  0410   CHOL 184   HDL 91*   LDLCALC 84.2   TRIG 44   CHOLHDL 49.5    and Troponin   Recent Labs   Lab 02/19/20  1319 02/19/20  1912 02/20/20  0103   TROPONINI <0.006 <0.006 <0.006       Significant Imaging: EKG: NSR

## 2020-02-20 NOTE — HPI
Christa Caicedo is an 78 yo female who presents to [facility] after a syncopal episode. Apparent LOC while standing. Dementia. HTN. HLP. Is on diuretic. Not orthostatic. EKG NSR. No hx of syncope. No hx of CAD.

## 2020-02-20 NOTE — PROGRESS NOTES
Causes of Syncope  Syncope (fainting) has many causes. Sometimes it is not serious. In other cases, syncope is a sign of a heart problem. But treatment can help    When syncope is not serious  Your healthcare provider may call your problem vasovagal syncope, reflex syncope, or orthostatic hypotension. These types of syncope are generally not serious. They can be caused by:  · Strong feelings, such as anxiety or fear. A nerve signal may briefly change your heart rate and lower your blood pressure too much.  · Standing for too long. Standing may cause blood to pool in your legs. When this happens, your brain may not receive all the blood it needs.  · Standing up too quickly. Your blood pressure may not adjust fast enough to changes in posture and may drop too low. Certain medicines can also cause this problem. Examples of medicines that can cause a drop in blood pressure include diuretics, blood pressure medicines, and medicines for chest pain. Your pharmacist or healthcare provider can discuss these with you.  · Reaction to normal body functions. When you go to the bathroom, have gastrointestinal discomfort, nausea, or pain, your heart may have a natural reflex to slow down and lower blood pressure. This can result in syncope. This may also follow exercise, eating, laughter, weight lifting, or playing musical instruments like the trumpet or trombone.  When heart trouble causes syncope  A heart problem can decrease the amount of oxygen-rich blood that reaches the brain. Heart trouble can be serious and even life threatening if not treated:  · A slow heart rate. Electrical signals tell the chambers of the heart when to pump. But the signals may be slowed or blocked (heart block) as they travel on the hearts electrical pathways. This can be caused by aging, scarred heart tissue, or damage from heart disease. When the heart rate slows, not enough blood is pumped.  · A fast heart rate. Certain problems can make the heart  race. For instance, after a heart attack, also known as acute myocardial infarction, or AMI, abnormal electrical signals may be created. These signals can make the heart suddenly beat very fast. The heart pumps before the chambers can fill with blood. So less blood reaches the brain and other parts of the body. Illegal drugs, certain medicines, heart disease, or an inherited condition can also cause this.  · A heart valve problem. Blood travels through the chambers of the heart as it is pumped. Heart valves open and close to help move blood in the right direction. But a valve may not open or close fully, if its hardened or scarred. As a result, less blood is pumped through the heart to the brain and body. Most often, syncope occurs when a person's aortic valve is critically narrowed and he or she participates in  a strenuous activity.  · A heart muscle problem. Some people develop a thickened heart muscle that blocks blood flow out of the heart to the body. This is called hypertrophic cardiomyopathy. Being dehydrated and having hypertrophic cardiomyopathy can increase the risk for syncope.  Whatever the cause of syncope, it is important to be evaluated by your healthcare provider. You may need to be seen by a cardiologist, neurologist, or an ear, nose, and throat specialist. Do not drive, operate heavy machinery, or participate in activities in which you would be at risk for falls and injury if you have syncope and have not been evaluated.

## 2020-02-20 NOTE — PROGRESS NOTES
WRITTEN HEALTHCARE DISCHARGE INFORMATION      Things that YOU are RESPONSIBLE for to Manage Your Care At Home:     1. Getting your prescriptions filled.  2. Taking you medications as directed. DO NOT MISS ANY DOSES!  3. Going to your follow-up doctor appointments. This is important because it allows the doctor to monitor your progress and to determine if any changes need to be made to your treatment plan.     If you are unable to make your follow up appointments, please call the number listed and reschedule this appointment.      ____________HELP AT HOME____________________     Experiencing any SIGNS or SYMPTOMS: YOU CAN     Schedule a same day appopintment with your Primary Care Doctor or  you can call [facility] On Call Nurse Care Line for 24/7 assistance at [phone]     If you are experience any signs or symptoms that have become severe, Call 911 and come to your nearest Emergency Room.     Thank you for choosing [facility] and allowing us to care for you.   From your care management team:      You should receive a call from [facility] Discharge Department within 48-72 hours to help manage your care after discharge. Please try to make sure that you answer your phone for this important phone call.    Follow-up Information     Hannah Yoo MD   On 3/2/2020.    Why:  please keep appointment scheduled for March 2nd as scheduled prior to coming to hospital  Contact information:  Hannah Yoo MD    [address]    [place] [address]    [phone]             Alvaro Velasquez MD On 2/26/2020.    Specialties:  Cardiovascular Disease, INTERVENTIONAL CARDIOLOGY, Cardiology  Why:  appointment scheduled February 26th at 2pm  Contact information:  [address]  [address]  [place] [address]  [phone]

## 2020-02-21 NOTE — DISCHARGE SUMMARY
[facility] Hospital Medicine  Discharge Summary      Patient Name: Christa Caicedo  MRN: [id number]  Admission Date: 2/19/2020  Hospital Length of Stay: 0 days  Discharge Date and Time: 2/20/2020  Attending Physician: No att. providers found   Discharging Provider: Maricruz Mueller NP  Primary Care Provider: Hannah Yoo MD      HPI:   Christa Caicedo 79 y.o. female with dementia, hypothyroidism, HLD, HTN presents to the hospital with a chief complaint of syncope. She reports today she was standing in her house and then was found by her family leaning against the wall. She then remembers walking to her bed at her family's direction. She denies any head trauma, incontinence, or tongue biting. She feels well currently. She has never experienced symptoms such as this before. She denies fever, chest pain, SOB, N/V, abdominal pain, leg swelling, dizziness, weakness of extremity, numbness, visual disturbance, and palpitations.     In the ED, EKG of NSR, troponin negative, TSH 1.7, and BG 98.     * No surgery found *      Hospital Course:   Christa Caicedo 79 y.o. female placed in observation for syncope possible due to poor intake. CT head no acute intracranial abnormality. US carotid no stenosis. UA no infection. Tele no ectomy. Troponin negative x 3. 2 D echo EF 55%, indeterminate LF diastolic function and no WMA. Patient remained without neuro deficits during observation stay. Patient stable for discharge home with family. Cardiology recommended outpatient event monitor.      Consults:   Consults (From admission, onward)        Status Ordering Provider     Inpatient consult to Cardiology  Once     Provider:  Alvaro Velasquez MD    Completed LISSETTE NINO new Assessment & Plan notes have been filed under this hospital service since the last note was generated.  Service: Hospital Medicine    Final Active Diagnoses:    Diagnosis Date Noted POA    PRINCIPAL  PROBLEM:  Syncope [R55] 02/19/2020 Yes    Memory loss [R41.3] 02/19/2020 Yes    Autoimmune thyroiditis [E06.3] 02/19/2020 Yes    Hypertension [I10] 03/28/2016 Yes      Problems Resolved During this Admission:       Discharged Condition: good    Disposition: Home or Self Care    Follow Up:  Follow-up Information     Hannah Yoo MD   On 3/2/2020.    Why:  please keep appointment scheduled for March 2nd as scheduled prior to coming to hospital  Contact information:  Hannah Yoo MD    [address] [place] [address]    [phone]             Alvaro Velasquez MD On 2/26/2020.    Specialties:  Cardiovascular Disease, INTERVENTIONAL CARDIOLOGY, Cardiology  Why:  appointment scheduled February 26th at 2pm  Contact information:  [address]  [address]  [place] [address]  [phone]                 Patient Instructions:      Notify your health care provider if you experience any of the following:  temperature >100.4     Notify your health care provider if you experience any of the following:  difficulty breathing or increased cough     Notify your health care provider if you experience any of the following:  increased confusion or weakness     Activity as tolerated       Significant Diagnostic Studies: Labs: All labs within the past 24 hours have been reviewed    Pending Diagnostic Studies:     None         Medications:  Reconciled Home Medications:      Medication List      START taking these medications    amLODIPine 10 MG tablet  Commonly known as:  NORVASC  Take 1 tablet (10 mg total) by mouth once daily.     atorvastatin 10 MG tablet  Commonly known as:  LIPITOR  Take 1 tablet (10 mg total) by mouth every evening.     donepezil 10 MG tablet  Commonly known as:  ARICEPT  Take 1 tablet (10 mg total) by mouth every evening.     liothyronine 5 MCG Tab  Commonly known as:  CYTOMEL  Take 1 tablet (5 mcg total) by mouth before breakfast.     memantine 10 MG Tab  Commonly known as:   NAMENDA  Take 1 tablet (10 mg total) by mouth 2 (two) times daily.     montelukast 10 mg tablet  Commonly known as:  SINGULAIR  Take 1 tablet (10 mg total) by mouth every evening.     olmesartan 40 MG tablet  Commonly known as:  BENICAR  Take 1 tablet (40 mg total) by mouth every evening.        CONTINUE taking these medications    brimonidine 0.2% 0.2 % Drop  Commonly known as:  ALPHAGAN  Place 1 drop into both eyes 2 (two) times daily.     calcium carbonate 500 mg calcium (1,250 mg) tablet  Commonly known as:  OS-OH  Take 2 tablets by mouth 2 (two) times daily.     glucosamine-chondroitin 500-400 mg tablet  Take 1 tablet by mouth 2 (two) times daily.     magnesium oxide 400 mg (241.3 mg magnesium) tablet  Commonly known as:  MAG-OX  Take 400 mg by mouth once daily.     multivitamin capsule  Take 1 capsule by mouth once daily.        STOP taking these medications    allopurinoL 300 MG tablet  Commonly known as:  ZYLOPRIM     losartan-hydrochlorothiazide 100-25 mg 100-25 mg per tablet  Commonly known as:  HYZAAR     pravastatin 40 MG tablet  Commonly known as:  PRAVACHOL            Indwelling Lines/Drains at time of discharge:   Lines/Drains/Airways     None                 Time spent on the discharge of patient: 30 minutes  Patient was seen and examined on the date of discharge and determined to be suitable for discharge.         Maricruz Mueller NP  Department of Hospital Medicine  [facility]

## 2020-02-26 ENCOUNTER — OFFICE VISIT (OUTPATIENT)
Dept: CARDIOLOGY | Facility: CLINIC | Age: 80
End: 2020-02-26
Payer: MEDICARE

## 2020-02-26 VITALS
OXYGEN SATURATION: 99 % | SYSTOLIC BLOOD PRESSURE: 124 MMHG | HEART RATE: 71 BPM | BODY MASS INDEX: 26.33 KG/M2 | HEIGHT: 67 IN | WEIGHT: 167.75 LBS | DIASTOLIC BLOOD PRESSURE: 70 MMHG

## 2020-02-26 DIAGNOSIS — E78.5 HYPERLIPIDEMIA, UNSPECIFIED HYPERLIPIDEMIA TYPE: ICD-10-CM

## 2020-02-26 DIAGNOSIS — R55 SYNCOPE, UNSPECIFIED SYNCOPE TYPE: Primary | ICD-10-CM

## 2020-02-26 DIAGNOSIS — I10 ESSENTIAL HYPERTENSION: ICD-10-CM

## 2020-02-26 DIAGNOSIS — R41.3 MEMORY LOSS: ICD-10-CM

## 2020-02-26 PROCEDURE — 3074F SYST BP LT 130 MM HG: CPT | Mod: CPTII,S$GLB,, | Performed by: INTERNAL MEDICINE

## 2020-02-26 PROCEDURE — 99999 PR PBB SHADOW E&M-EST. PATIENT-LVL III: CPT | Mod: PBBFAC,,, | Performed by: INTERNAL MEDICINE

## 2020-02-26 PROCEDURE — 1101F PT FALLS ASSESS-DOCD LE1/YR: CPT | Mod: CPTII,S$GLB,, | Performed by: INTERNAL MEDICINE

## 2020-02-26 PROCEDURE — 1159F PR MEDICATION LIST DOCUMENTED IN MEDICAL RECORD: ICD-10-PCS | Mod: S$GLB,,, | Performed by: INTERNAL MEDICINE

## 2020-02-26 PROCEDURE — 3074F PR MOST RECENT SYSTOLIC BLOOD PRESSURE < 130 MM HG: ICD-10-PCS | Mod: CPTII,S$GLB,, | Performed by: INTERNAL MEDICINE

## 2020-02-26 PROCEDURE — 3078F DIAST BP <80 MM HG: CPT | Mod: CPTII,S$GLB,, | Performed by: INTERNAL MEDICINE

## 2020-02-26 PROCEDURE — 99999 PR PBB SHADOW E&M-EST. PATIENT-LVL III: ICD-10-PCS | Mod: PBBFAC,,, | Performed by: INTERNAL MEDICINE

## 2020-02-26 PROCEDURE — 1126F PR PAIN SEVERITY QUANTIFIED, NO PAIN PRESENT: ICD-10-PCS | Mod: S$GLB,,, | Performed by: INTERNAL MEDICINE

## 2020-02-26 PROCEDURE — 99213 OFFICE O/P EST LOW 20 MIN: CPT | Mod: S$GLB,,, | Performed by: INTERNAL MEDICINE

## 2020-02-26 PROCEDURE — 1159F MED LIST DOCD IN RCRD: CPT | Mod: S$GLB,,, | Performed by: INTERNAL MEDICINE

## 2020-02-26 PROCEDURE — 3078F PR MOST RECENT DIASTOLIC BLOOD PRESSURE < 80 MM HG: ICD-10-PCS | Mod: CPTII,S$GLB,, | Performed by: INTERNAL MEDICINE

## 2020-02-26 PROCEDURE — 1101F PR PT FALLS ASSESS DOC 0-1 FALLS W/OUT INJ PAST YR: ICD-10-PCS | Mod: CPTII,S$GLB,, | Performed by: INTERNAL MEDICINE

## 2020-02-26 PROCEDURE — 99213 PR OFFICE/OUTPT VISIT, EST, LEVL III, 20-29 MIN: ICD-10-PCS | Mod: S$GLB,,, | Performed by: INTERNAL MEDICINE

## 2020-02-26 PROCEDURE — 1126F AMNT PAIN NOTED NONE PRSNT: CPT | Mod: S$GLB,,, | Performed by: INTERNAL MEDICINE

## 2020-02-26 RX ORDER — AMLODIPINE BESYLATE 5 MG/1
5 TABLET ORAL 2 TIMES DAILY
Qty: 60 TABLET | Refills: 11 | Status: SHIPPED | OUTPATIENT
Start: 2020-02-26 | End: 2021-07-04

## 2020-02-26 NOTE — PROGRESS NOTES
CARDIOLOGY CLINIC VISIT        HISTORY OF PRESENT ILLNESS:     Inocente INGRAM is an 79-year-old female who presents for continued care.  Seen on February 20th after a syncopal episode.  Patient was apparently standing, sudden loss of consciousness.  She was not orthostatic.  EKG showed normal sinus rhythm.  No prior history of syncope.  No history of coronary artery disease.  Echocardiogram showed normal left ventricular systolic function with an estimated ejection fraction of 55%.  Mild-to-moderate tricuspid regurgitation.  Mild pulmonary artery hypertension. Blood pressure looks good today. She feels good. No complaints. Son says that her evening blood pressure is becoming a little elevated into the 160s.     CARDIOVASCULAR HISTORY:     None    PAST MEDICAL HISTORY:     Past Medical History:   Diagnosis Date    GERD (gastroesophageal reflux disease)     Glaucoma (increased eye pressure)     Hyperlipidemia     Hypertension     Kidney stones     Osteoarthritis     Syncope 02/19/2020       PAST SURGICAL HISTORY:     Past Surgical History:   Procedure Laterality Date    CHOLECYSTECTOMY  2012    Laparoscopic    TUBAL LIGATION      2/19/2020:  patient and daughter denies procedure was done       ALLERGIES AND MEDICATION:     Review of patient's allergies indicates:   Allergen Reactions    Chocolate flavor Hives    Pcn [penicillins] Rash        Medication List           Accurate as of February 26, 2020  2:12 PM. If you have any questions, ask your nurse or doctor.               CONTINUE taking these medications    amLODIPine 10 MG tablet  Commonly known as:  NORVASC  Take 1 tablet (10 mg total) by mouth once daily.     atorvastatin 10 MG tablet  Commonly known as:  LIPITOR  Take 1 tablet (10 mg total) by mouth every evening.     brimonidine 0.2% 0.2 % Drop  Commonly known as:  ALPHAGAN     calcium carbonate 500 mg calcium (1,250 mg) tablet  Commonly known as:  OS-OH     donepezil 10 MG tablet  Commonly  known as:  ARICEPT  Take 1 tablet (10 mg total) by mouth every evening.     glucosamine-chondroitin 500-400 mg tablet     liothyronine 5 MCG Tab  Commonly known as:  CYTOMEL  Take 1 tablet (5 mcg total) by mouth before breakfast.     magnesium oxide 400 mg (241.3 mg magnesium) tablet  Commonly known as:  MAG-OX     memantine 10 MG Tab  Commonly known as:  NAMENDA  Take 1 tablet (10 mg total) by mouth 2 (two) times daily.     montelukast 10 mg tablet  Commonly known as:  SINGULAIR  Take 1 tablet (10 mg total) by mouth every evening.     multivitamin capsule     olmesartan 40 MG tablet  Commonly known as:  BENICAR  Take 1 tablet (40 mg total) by mouth every evening.            SOCIAL HISTORY:     Social History     Socioeconomic History    Marital status: Single     Spouse name: Not on file    Number of children: Not on file    Years of education: Not on file    Highest education level: Not on file   Occupational History    Not on file   Social Needs    Financial resource strain: Not on file    Food insecurity:     Worry: Not on file     Inability: Not on file    Transportation needs:     Medical: Not on file     Non-medical: Not on file   Tobacco Use    Smoking status: Never Smoker    Smokeless tobacco: Never Used   Substance and Sexual Activity    Alcohol use: No     Alcohol/week: 0.0 standard drinks    Drug use: No    Sexual activity: Not Currently     Birth control/protection: Post-menopausal     Comment:    Lifestyle    Physical activity:     Days per week: Not on file     Minutes per session: Not on file    Stress: Not on file   Relationships    Social connections:     Talks on phone: Not on file     Gets together: Not on file     Attends Nondenominational service: Not on file     Active member of club or organization: Not on file     Attends meetings of clubs or organizations: Not on file     Relationship status: Not on file   Other Topics Concern    Not on file   Social History Narrative     "Not on file       FAMILY HISTORY:     Family History   Problem Relation Age of Onset    Diabetes Brother     Hypertension Mother     Stroke Neg Hx     Cancer Neg Hx     Breast cancer Neg Hx     Colon cancer Neg Hx     Ovarian cancer Neg Hx        REVIEW OF SYSTEMS:   Review of Systems   Respiratory: Negative for sputum production, shortness of breath and wheezing.    Cardiovascular: Negative for chest pain, palpitations, orthopnea, claudication, leg swelling and PND.   Neurological: Negative for dizziness, sensory change, speech change, focal weakness, loss of consciousness, weakness and headaches.       PHYSICAL EXAM:     Vitals:    02/26/20 1351   BP: 124/70   Pulse: 71    Body mass index is 26.28 kg/m².  Weight: 76.1 kg (167 lb 12.3 oz)   Height: 5' 7" (170.2 cm)     Physical Exam   Constitutional: No distress.   HENT:   Head: Normocephalic.   Neck: No JVD present. Carotid bruit is not present.   Cardiovascular: Normal rate, regular rhythm and intact distal pulses.   Murmur heard.   Systolic murmur is present with a grade of 2/6.  Pulmonary/Chest: Effort normal and breath sounds normal.   Abdominal: Soft. Bowel sounds are normal. There is no tenderness.   Neurological: She is alert.   Skin: Skin is warm and dry. She is not diaphoretic.   Psychiatric: She has a normal mood and affect. Her speech is normal and behavior is normal.   Vitals reviewed.      DATA:     Laboratory:  CBC:  Recent Labs   Lab 02/19/20  1243   WBC 4.15   Hemoglobin 11.2 L   Hematocrit 33.8 L   Platelets 195       CHEMISTRIES:  Recent Labs   Lab 02/19/20  1243 02/20/20  0410   Glucose 100 86   Sodium 142 141   Potassium 3.7 3.4 L   BUN, Bld 18 12   Creatinine 1.0 0.7   eGFR if  >60 >60   eGFR if non African American 53 A >60   Calcium 9.2 9.3       CARDIAC BIOMARKERS:  Recent Labs   Lab 02/19/20  1319 02/19/20  1912 02/20/20  0103   Troponin I <0.006 <0.006 <0.006       COAGS:        LIPIDS/LFTS:  Recent Labs   Lab " 02/19/20  1243 02/20/20  0410   Cholesterol  --  184   Triglycerides  --  44   HDL  --  91 H   LDL Cholesterol  --  84.2   Non-HDL Cholesterol  --  93   AST 26  --    ALT 22  --        Cardiovascular Testing:    Echo 2/20/20:    · Normal left ventricular systolic function. The estimated ejection fraction is 55%.  · Concentric left ventricular remodeling.  · Indeterminate left ventricular diastolic function.  · Normal right ventricular systolic function.  · Mild right atrial enlargement.  · Mild to moderate tricuspid regurgitation.  · Mild pulmonary hypertension present.    ASSESSMENT:     1. Syncope  2. HTN  3. Normal LVEF  4. HLP: LDL 84  5. Dementia    PLAN:     1. Change amlodipine to 5 bid.  2. BP log  3. RTC 3 months.        Alvaro Velasquez MD, MPH, FACC, [facility]

## 2021-04-16 ENCOUNTER — PATIENT MESSAGE (OUTPATIENT)
Dept: RESEARCH | Facility: HOSPITAL | Age: 81
End: 2021-04-16

## 2024-07-03 NOTE — ED TRIAGE NOTES
"Pt BIB EMS for +LOC, pt was leaning against a wall when family states she "went down". they lowered her to ground. pt denies pain or complaints. Pt in NAD at this time, resting in stretcher.   " Detail Level: Detailed